# Patient Record
Sex: FEMALE | Race: WHITE | NOT HISPANIC OR LATINO | URBAN - METROPOLITAN AREA
[De-identification: names, ages, dates, MRNs, and addresses within clinical notes are randomized per-mention and may not be internally consistent; named-entity substitution may affect disease eponyms.]

---

## 2018-11-28 ENCOUNTER — APPOINTMENT (RX ONLY)
Dept: URBAN - METROPOLITAN AREA OTHER 9 | Facility: OTHER | Age: 58
Setting detail: DERMATOLOGY
End: 2018-11-28

## 2018-11-28 DIAGNOSIS — L81.4 OTHER MELANIN HYPERPIGMENTATION: ICD-10-CM

## 2018-11-28 DIAGNOSIS — L50.1 IDIOPATHIC URTICARIA: ICD-10-CM

## 2018-11-28 DIAGNOSIS — D17 BENIGN LIPOMATOUS NEOPLASM: ICD-10-CM

## 2018-11-28 DIAGNOSIS — L82.1 OTHER SEBORRHEIC KERATOSIS: ICD-10-CM

## 2018-11-28 PROBLEM — D17.24 BENIGN LIPOMATOUS NEOPLASM OF SKIN AND SUBCUTANEOUS TISSUE OF LEFT LEG: Status: ACTIVE | Noted: 2018-11-28

## 2018-11-28 PROBLEM — L30.9 DERMATITIS, UNSPECIFIED: Status: ACTIVE | Noted: 2018-11-28

## 2018-11-28 PROCEDURE — 99203 OFFICE O/P NEW LOW 30 MIN: CPT

## 2018-11-28 PROCEDURE — ? COUNSELING

## 2018-11-28 PROCEDURE — ? PATIENT SPECIFIC COUNSELING

## 2018-11-28 PROCEDURE — ? PRESCRIPTION

## 2018-11-28 PROCEDURE — ? OTHER

## 2018-11-28 RX ORDER — AZITHROMYCIN DIHYDRATE 1 G/1
POWDER, FOR SUSPENSION ORAL QD
Qty: 1 | Refills: 0 | Status: ERX | COMMUNITY
Start: 2018-11-28

## 2018-11-28 RX ORDER — MONTELUKAST SODIUM 10 MG/1
TABLET, FILM COATED ORAL
Qty: 30 | Refills: 1 | Status: ERX | COMMUNITY
Start: 2018-11-28

## 2018-11-28 RX ADMIN — MONTELUKAST SODIUM: 10 TABLET, FILM COATED ORAL at 00:00

## 2018-11-28 RX ADMIN — AZITHROMYCIN DIHYDRATE: 1 POWDER, FOR SUSPENSION ORAL at 00:00

## 2018-11-28 ASSESSMENT — LOCATION SIMPLE DESCRIPTION DERM
LOCATION SIMPLE: LEFT UPPER BACK
LOCATION SIMPLE: RIGHT HAND
LOCATION SIMPLE: LEFT HAND
LOCATION SIMPLE: LEFT CALF
LOCATION SIMPLE: LEFT PRETIBIAL REGION

## 2018-11-28 ASSESSMENT — LOCATION ZONE DERM
LOCATION ZONE: TRUNK
LOCATION ZONE: HAND
LOCATION ZONE: LEG

## 2018-11-28 ASSESSMENT — LOCATION DETAILED DESCRIPTION DERM
LOCATION DETAILED: RIGHT THENAR EMINENCE
LOCATION DETAILED: LEFT DISTAL PRETIBIAL REGION
LOCATION DETAILED: LEFT SUPERIOR MEDIAL UPPER BACK
LOCATION DETAILED: LEFT THENAR EMINENCE
LOCATION DETAILED: LEFT PROXIMAL CALF

## 2018-11-28 NOTE — PROCEDURE: PATIENT SPECIFIC COUNSELING
Detail Level: Simple
History suggests that this is worsened with scratching. Pt with many week hx of URI -consider this could be urticarial reaction related to URI.  Will optimize antihistamines and treat for sinusitus.

## 2018-11-28 NOTE — PROCEDURE: OTHER
Note Text (......Xxx Chief Complaint.): This diagnosis correlates with the
Detail Level: Simple
Other (Free Text): Allegra x 3

## 2019-01-09 ENCOUNTER — RX ONLY (OUTPATIENT)
Age: 59
Setting detail: RX ONLY
End: 2019-01-09

## 2019-01-09 ENCOUNTER — APPOINTMENT (RX ONLY)
Dept: URBAN - METROPOLITAN AREA OTHER 9 | Facility: OTHER | Age: 59
Setting detail: DERMATOLOGY
End: 2019-01-09

## 2019-01-09 DIAGNOSIS — L50.1 IDIOPATHIC URTICARIA: ICD-10-CM

## 2019-01-09 PROBLEM — L30.9 DERMATITIS, UNSPECIFIED: Status: ACTIVE | Noted: 2019-01-09

## 2019-01-09 PROCEDURE — ? PATIENT SPECIFIC COUNSELING

## 2019-01-09 PROCEDURE — ? COUNSELING

## 2019-01-09 PROCEDURE — 99213 OFFICE O/P EST LOW 20 MIN: CPT

## 2019-01-09 RX ORDER — MONTELUKAST SODIUM 10 MG/1
TABLET, FILM COATED ORAL
Qty: 30 | Refills: 6 | Status: ERX

## 2019-01-09 ASSESSMENT — LOCATION ZONE DERM: LOCATION ZONE: HAND

## 2019-01-09 ASSESSMENT — LOCATION DETAILED DESCRIPTION DERM
LOCATION DETAILED: LEFT THENAR EMINENCE
LOCATION DETAILED: RIGHT THENAR EMINENCE

## 2019-01-09 ASSESSMENT — LOCATION SIMPLE DESCRIPTION DERM
LOCATION SIMPLE: LEFT HAND
LOCATION SIMPLE: RIGHT HAND

## 2019-01-09 NOTE — PROCEDURE: PATIENT SPECIFIC COUNSELING
Dr. Coley told patient to continue to use Allegra once a day and singulair. Dr. Coley advised patient to stop taking medicine in a few months to see if the reaction has gone away
Detail Level: Simple

## 2021-11-09 ENCOUNTER — OFFICE VISIT (OUTPATIENT)
Dept: URBAN - METROPOLITAN AREA CLINIC 96 | Facility: CLINIC | Age: 61
End: 2021-11-09

## 2021-12-16 ENCOUNTER — OFFICE VISIT (OUTPATIENT)
Dept: URBAN - METROPOLITAN AREA CLINIC 98 | Facility: CLINIC | Age: 61
End: 2021-12-16

## 2022-02-11 ENCOUNTER — DASHBOARD ENCOUNTERS (OUTPATIENT)
Age: 62
End: 2022-02-11

## 2022-02-11 ENCOUNTER — OFFICE VISIT (OUTPATIENT)
Dept: URBAN - METROPOLITAN AREA CLINIC 98 | Facility: CLINIC | Age: 62
End: 2022-02-11
Payer: COMMERCIAL

## 2022-02-11 DIAGNOSIS — E78.5 DYSLIPIDEMIA: ICD-10-CM

## 2022-02-11 DIAGNOSIS — R16.0 HEPATOMEGALY: ICD-10-CM

## 2022-02-11 DIAGNOSIS — R19.4 CHANGE IN BOWEL HABITS: ICD-10-CM

## 2022-02-11 DIAGNOSIS — R74.8 ELEVATED LIVER ENZYMES: ICD-10-CM

## 2022-02-11 PROCEDURE — 99204 OFFICE O/P NEW MOD 45 MIN: CPT | Performed by: INTERNAL MEDICINE

## 2022-02-11 RX ORDER — MONTELUKAST 10 MG/1
TABLET, FILM COATED ORAL
Qty: 90 | Status: ACTIVE | COMMUNITY

## 2022-02-11 RX ORDER — ERGOCALCIFEROL CAPSULES, 1.25 MG/1
CAPSULE ORAL
Qty: 12 | Status: ACTIVE | COMMUNITY

## 2022-02-11 RX ORDER — CARVEDILOL PHOSPHATE 20 MG/1
CAPSULE, EXTENDED RELEASE ORAL
Qty: 90 | Status: ACTIVE | COMMUNITY

## 2022-02-11 RX ORDER — COLESEVELAM HYDROCHLORIDE 625 MG/1
3 TABLETS WITH MEALS TABLET, FILM COATED ORAL TWICE A DAY
Qty: 180 | Refills: 3 | OUTPATIENT

## 2022-02-11 NOTE — HPI-TODAY'S VISIT:
Here to discuss recent findings of an enlarged liver.   Has had no prior knowledge of liver disease in herself or in her family   . Last year with diverticulitis, hospitalized at [facility].   saw GI at [place] - had colonoscopy : diverticulosis.  started on abx and ended up being treated on abx for weeks.   8 wk ago was the last abx.  now having bm 6 / day ; previously 1-2 bm/day  . Has HLD - on statin - had started on Vasepa but she didn't continue bc cost Cardiology Dr Felix

## 2022-02-11 NOTE — HPI-OTHER HISTORIES
CT 11/2021  FINDINGS:  LOWER THORAX: There is bibasal lung atelectasis. There is no cardiomegaly or pericardial effusion. Patchy ground-glass opacities are seen of the lung bases, nonspecific particularly of the lateral aspect of the right lower lung on image #5 of series 2.  ABDOMEN: Liver: Liver measures 22 cm in craniocaudal dimension. No portal vein thrombus. There is no intrahepatic biliary distention. There is no focal hepatic mass.  Gallbladder and biliary system: The gallbladder is normal. The extrahepatic bile duct is not dilated.  Spleen: Normal and without mass.  Pancreas: Normal. No pancreatic mass. No pancreatic duct dilatation. No peripancreatic fat stranding or fluid.  Adrenal glands: Normal. No focal nodule.  Kidneys and Collecting System: Normal. No renal mass. No hydronephrosis.  Gastrointestinal tract: There is a moderate duodenal diverticulum. No acute colitis, enteritis or bowel obstruction. There is distal colon diverticulosis.  Peritoneum/Retroperitoneum: No adenopathy. No ascites. No free air.  Abdominal wall: Normal. No abdominal wall hernia or fluid collection.  Vascular: The visualized abdominal aorta and its major branches are normal. No aneurysm.  PELVIS: Urinary bladder: Normal.  Reproductive organs: Uterus has been removed. There are no adnexal masses.  Peritoneum/Retroperitoneum: No adenopathy. No ascites.  Lower abdominal/anterior pelvic wall: Normal. No inguinal hernia or fluid collection.  Vascular: Normal.  MUSCULOSKELETAL: There is moderate lumbar spondylosis. No aggressive osseous lesions.  IMPRESSION: Significant hepatomegaly. No findings for focal mass.  Distal colon diverticulosis without diverticulitis. . 11/12/2021 08:52 WBC 9.0 K/mcL 11/12/2021 08:52 RBC 4.22 M/mcL 11/12/2021 08:52 Hgb 14.2 g/dL 11/12/2021 08:52 Hct 41.2 % 11/12/2021 08:52 MCV 98 fL 11/12/2021 08:52 MCH 33.6 pg 11/12/2021 08:52 MCHC 34.5 g/dL 11/12/2021 08:52 RDW 12.5 % 11/12/2021 08:52 Platelets 181 K/mcL 11/12/2021 08:52 Neutro Auto 60 % 11/12/2021 08:52 Lymph Auto 33 % 11/12/2021 08:52 Mono Auto 6 % 11/12/2021 08:52 Eos Auto 1 % 11/12/2021 08:52 Basophil Auto 0 % 11/12/2021 08:52 Immature Granulocytes Auto 0 % 11/12/2021 08:52 Absolute Neutrophil Count (ANC) 5.3 K/mcL 11/12/2021 08:52 Absolute Lymphocyte Count 3.0 K/mcL 11/12/2021 08:52 Absolute Monocyte Count 0.6 K/mcL 11/12/2021 08:52 Absolute Eosinophil Count 0.1 K/mcL 11/12/2021 08:52 Absolute Basophil Count 0.0 K/mcL 11/12/2021 08:52 Absolute Immature Granulocyte Count 0.0 K/mcL 11/12/2021 08:52 Sodium Level 141 mmol/L 11/12/2021 08:52 Potassium Level 4.5 mmol/L 11/12/2021 08:52 Chloride Level 107 mmol/L 11/12/2021 08:52 CO2 Level 17 mmol/L 11/12/2021 08:52 Glucose Level 98 mg/dL 11/12/2021 08:52 BUN 17 mg/dL 11/12/2021 08:52 Creatinine Level 0.87 mg/dL 11/12/2021 08:52 Calcium Level 9.8 mg/dL 11/12/2021 08:52 Protein Total 6.6 g/dL 11/12/2021 08:52 Albumin Level 4.2 g/dL 11/12/2021 08:52 Bilirubin Total 0.3 mg/dL 11/12/2021 08:52 Alk Phos 196 IntlUnit/L 11/12/2021 08:52 AST 44 IntlUnit/L 11/12/2021 08:52 ALT 40 IntlUnit/L 11/12/2021 08:52 BUN/Creat Ratio 20 11/12/2021 08:52 AG Ratio 1.8 11/12/2021 08:52 eGFR AA 82 mL/min/1.73 m2 11/12/2021 08:52 eGFR Non-AA 71 mL/min/1.73 m2 11/12/2021 08:52 Vitamin B12 Level 306 pg/mL 11/12/2021 08:52 Vitamin D 25 OH 14.4 ng/mL 11/12/2021 08:52 TSH 2.730 mcIntlUnit/mL 11/12/2021 08:52 Cholesterol Total 340 mg/dL 11/12/2021 08:52 Triglycerides 1,284 mg/dL 11/12/2021 08:52 HDL 37 mg/dL 11/12/2021 08:52 Alpha 1 Electrophoresis 0.2 g/dL 11/12/2021 08:52 Alpha 2 Electrophoresis 1.1 g/dL 11/12/2021 08:52 Gamma Electrophoresis 0.7 g/dL 11/12/2021 08:52 PEP interpretation Comment 11/12/2021 08:52 Beta Globulin 1.1 g/dL 11/12/2021 08:52 Globulin, Total 2.4 g/dL 11/12/2021 08:52 Globulin, Total 3.0 g/dL 11/12/2021 08:52 VLDL Cholesterol Scott Comment mg/dL 11/12/2021 08:52 LDL Cholesterol Calc Comment mg/dL

## 2022-02-12 PROBLEM — 370992007: Status: ACTIVE | Noted: 2022-02-11

## 2022-04-11 ENCOUNTER — LAB OUTSIDE AN ENCOUNTER (OUTPATIENT)
Dept: URBAN - METROPOLITAN AREA CLINIC 98 | Facility: CLINIC | Age: 62
End: 2022-04-11

## 2023-03-27 ENCOUNTER — APPOINTMENT (RX ONLY)
Dept: URBAN - METROPOLITAN AREA OTHER 10 | Facility: OTHER | Age: 63
Setting detail: DERMATOLOGY
End: 2023-03-27

## 2023-03-27 DIAGNOSIS — D18.0 HEMANGIOMA: ICD-10-CM

## 2023-03-27 DIAGNOSIS — L85.3 XEROSIS CUTIS: ICD-10-CM

## 2023-03-27 DIAGNOSIS — L57.8 OTHER SKIN CHANGES DUE TO CHRONIC EXPOSURE TO NONIONIZING RADIATION: ICD-10-CM

## 2023-03-27 DIAGNOSIS — D22 MELANOCYTIC NEVI: ICD-10-CM

## 2023-03-27 DIAGNOSIS — L82.1 OTHER SEBORRHEIC KERATOSIS: ICD-10-CM

## 2023-03-27 DIAGNOSIS — L81.4 OTHER MELANIN HYPERPIGMENTATION: ICD-10-CM

## 2023-03-27 PROBLEM — D22.71 MELANOCYTIC NEVI OF RIGHT LOWER LIMB, INCLUDING HIP: Status: ACTIVE | Noted: 2023-03-27

## 2023-03-27 PROBLEM — D22.5 MELANOCYTIC NEVI OF TRUNK: Status: ACTIVE | Noted: 2023-03-27

## 2023-03-27 PROBLEM — D18.01 HEMANGIOMA OF SKIN AND SUBCUTANEOUS TISSUE: Status: ACTIVE | Noted: 2023-03-27

## 2023-03-27 PROCEDURE — ? ADDITIONAL NOTES

## 2023-03-27 PROCEDURE — 99213 OFFICE O/P EST LOW 20 MIN: CPT

## 2023-03-27 PROCEDURE — ? COUNSELING

## 2023-03-27 ASSESSMENT — LOCATION DETAILED DESCRIPTION DERM
LOCATION DETAILED: LEFT ANTERIOR PROXIMAL UPPER ARM
LOCATION DETAILED: SUPERIOR THORACIC SPINE
LOCATION DETAILED: RIGHT DISTAL POSTERIOR THIGH
LOCATION DETAILED: LEFT LATERAL SUPERIOR CHEST
LOCATION DETAILED: RIGHT INFERIOR ANTERIOR NECK
LOCATION DETAILED: LEFT PROXIMAL CALF
LOCATION DETAILED: LEFT SUPERIOR MEDIAL UPPER BACK
LOCATION DETAILED: LEFT MID-UPPER BACK
LOCATION DETAILED: EPIGASTRIC SKIN
LOCATION DETAILED: RIGHT MEDIAL UPPER BACK
LOCATION DETAILED: LEFT INFERIOR UPPER BACK
LOCATION DETAILED: RIGHT ANTERIOR DISTAL THIGH

## 2023-03-27 ASSESSMENT — LOCATION SIMPLE DESCRIPTION DERM
LOCATION SIMPLE: RIGHT UPPER BACK
LOCATION SIMPLE: LEFT UPPER BACK
LOCATION SIMPLE: ABDOMEN
LOCATION SIMPLE: RIGHT POSTERIOR THIGH
LOCATION SIMPLE: CHEST
LOCATION SIMPLE: RIGHT ANTERIOR NECK
LOCATION SIMPLE: RIGHT THIGH
LOCATION SIMPLE: LEFT CALF
LOCATION SIMPLE: LEFT UPPER ARM
LOCATION SIMPLE: UPPER BACK

## 2023-03-27 ASSESSMENT — LOCATION ZONE DERM
LOCATION ZONE: LEG
LOCATION ZONE: TRUNK
LOCATION ZONE: NECK
LOCATION ZONE: ARM

## 2023-03-27 NOTE — PROCEDURE: ADDITIONAL NOTES
Detail Level: Simple
Render Risk Assessment In Note?: no
Additional Notes: rec vaseline petroleum jelly\\nrec fragrance free/preservative free soap